# Patient Record
(demographics unavailable — no encounter records)

---

## 2024-11-19 NOTE — HISTORY OF PRESENT ILLNESS
[de-identified] : The patient is a 24 year old L hand dominate male presenting for L knee follow up Date of Surgery: 07/09/24 Pain: At Rest: 0/10 With Activity: 2-3/10 Mechanism of injury:  injured left knee in practice doing donkey kicks This is NOT a Work-Related Injury being treated under Worker's Compensation.  This is NOT an athletic injury occurring associated with an interscholastic or organized sports team. Quality of symptoms: dull pain, swelling posterior knee Improves with: rest, PT Worse with: single leg balancing  Treatment/Imaging/Studies Since Last Visit: PT 2x/weel at [facility] Reports Available For Review Today: None  Out of work/sport: Currently working since 8/15/24 School/Sport/Position/Occupation: wealth management  Change since last visit: Patient reports that he continues to make good improvement in strength. minimal complaints of pain. Still experiencing swelling in his posterior knee. has not progressed to running yet. Additional Information:  s/p Left knee EUA arthroscopic excision of loose bodies with chondral debridement; open matrix associated autologous chondral site implantation to the lateral femoral condyle and trochlear groove with lateral release with lateral retinacular lengthening.

## 2024-11-19 NOTE — HISTORY OF PRESENT ILLNESS
[de-identified] : The patient is a 24 year old L hand dominate male presenting for L knee follow up Date of Surgery: 07/09/24 Pain: At Rest: 0/10 With Activity: 2-3/10 Mechanism of injury:  injured left knee in practice doing donkey kicks This is NOT a Work-Related Injury being treated under Worker's Compensation.  This is NOT an athletic injury occurring associated with an interscholastic or organized sports team. Quality of symptoms: dull pain, swelling posterior knee Improves with: rest, PT Worse with: single leg balancing  Treatment/Imaging/Studies Since Last Visit: PT 2x/weel at [facility] Reports Available For Review Today: None  Out of work/sport: Currently working since 8/15/24 School/Sport/Position/Occupation: wealth management  Change since last visit: Patient reports that he continues to make good improvement in strength. minimal complaints of pain. Still experiencing swelling in his posterior knee. has not progressed to running yet. Additional Information:  s/p Left knee EUA arthroscopic excision of loose bodies with chondral debridement; open matrix associated autologous chondral site implantation to the lateral femoral condyle and trochlear groove with lateral release with lateral retinacular lengthening.

## 2024-11-19 NOTE — PHYSICAL EXAM
[de-identified] : The patient is a well appearing 24-year-old male of their stated age. Patient ambulates with a normal gait  Negative straight leg raise.   Surgical site: Left knee     Incision sites: Well healed   Range of motion: 0-140 degrees    Motor Testin+/5 quadriceps strengthb   Stability Testing: Stable ligamentous examination    Swelling/Effusion: Mild effusion   Tenderness to palpation: None   Provocative testing: Negative, -LM/PS/AD    Right Calf: soft and nontender Left Calf: soft and nontender   Neurovascular Examination: Grossly intact, 2+ distal pulses Contralateral Extremity: Examination grossly benign   Assessment & Plan: The patient is approximately 4.5 months s/p left knee EUA arthroscopic excision of loose bodies with chondral debridement; open matrix associated autologous chondral site implantation to the lateral femoral condyle and trochlear groove with lateral release with lateral retinacular lengthening. (DOS: 24) and s/p left knee EUA, arthroscopic assisted ACL reconstruction with patellar tendon autograft, autologous impaction bone grafting of the patella with tibial autograft bone, LMR, synovectomy and excision of plica with interval improvement (3/15/22). The patient's post-op plan, protocol and activity modifications have been thoroughly discussed and the patient expressed understanding. The patients condition continues to improve, denying any catching nor locking symptoms. Continue physical therapy to work on quad strengthening. We will order the patients post-op MRI to evaluate incorporation of ALVAREZ procedure, hold on obtaining until 6 months post-op. The patient will control pain as discussed & continue ice and elevation as needed. The patient otherwise may advance activity as discussed. Follow up 6 weeks, he will obtain an MRI one week prior to coming in.   The patient's current medication management of their orthopedic diagnosis was reviewed today: (1) We discussed a comprehensive treatment plan that included possible pharmaceutical management involving the use of prescription strength medications including but not limited to options such as oral Naprosyn 500mg BID, once daily Meloxicam 15 mg, or 500-650 mg Tylenol versus over the counter oral medications and topical prescription NSAID Pennsaid vs over the counter Voltaren gel.  Based on our extensive discussion, the patient declined prescription medication and will use over the counter Advil, Alleve, Voltaren Gel or Tylenol as directed. (2) There is a moderate risk of morbidity with further treatment, especially from use of prescription strength medications and possible side effects of these medications which include upset stomach with oral medications, skin reactions to topical medications and cardiac/renal issues with long term use. (3) I recommended that the patient follow-up with their medical physician to discuss any significant specific potential issues with long term medication use such as interactions with current medications or with exacerbation of underlying medical comorbidities. (4) The benefits and risks associated with use of injectable, oral or topical, prescription and over the counter anti-inflammatory medications were discussed with the patient. The patient voiced understanding of the risks including but not limited to bleeding, stroke, kidney dysfunction, heart disease, and were referred to the black box warning label for further information.   IDaisha attest that this documentation has been prepared under the direction and in the presence of Provider Dr. Chriss White.   The documentation recorded by the scribe accurately reflects the service PEARL Avalos PA-C personally performed and the decisions made by me. The patient was seen by me under the direct supervision of Dr. Chriss White

## 2024-11-19 NOTE — PHYSICAL EXAM
[de-identified] : The patient is a well appearing 24-year-old male of their stated age. Patient ambulates with a normal gait  Negative straight leg raise.   Surgical site: Left knee     Incision sites: Well healed   Range of motion: 0-140 degrees    Motor Testin+/5 quadriceps strengthb   Stability Testing: Stable ligamentous examination    Swelling/Effusion: Mild effusion   Tenderness to palpation: None   Provocative testing: Negative, -LM/PS/AD    Right Calf: soft and nontender Left Calf: soft and nontender   Neurovascular Examination: Grossly intact, 2+ distal pulses Contralateral Extremity: Examination grossly benign   Assessment & Plan: The patient is approximately 4.5 months s/p left knee EUA arthroscopic excision of loose bodies with chondral debridement; open matrix associated autologous chondral site implantation to the lateral femoral condyle and trochlear groove with lateral release with lateral retinacular lengthening. (DOS: 24) and s/p left knee EUA, arthroscopic assisted ACL reconstruction with patellar tendon autograft, autologous impaction bone grafting of the patella with tibial autograft bone, LMR, synovectomy and excision of plica with interval improvement (3/15/22). The patient's post-op plan, protocol and activity modifications have been thoroughly discussed and the patient expressed understanding. The patients condition continues to improve, denying any catching nor locking symptoms. Continue physical therapy to work on quad strengthening. We will order the patients post-op MRI to evaluate incorporation of ALVAREZ procedure, hold on obtaining until 6 months post-op. The patient will control pain as discussed & continue ice and elevation as needed. The patient otherwise may advance activity as discussed. Follow up 6 weeks, he will obtain an MRI one week prior to coming in.   The patient's current medication management of their orthopedic diagnosis was reviewed today: (1) We discussed a comprehensive treatment plan that included possible pharmaceutical management involving the use of prescription strength medications including but not limited to options such as oral Naprosyn 500mg BID, once daily Meloxicam 15 mg, or 500-650 mg Tylenol versus over the counter oral medications and topical prescription NSAID Pennsaid vs over the counter Voltaren gel.  Based on our extensive discussion, the patient declined prescription medication and will use over the counter Advil, Alleve, Voltaren Gel or Tylenol as directed. (2) There is a moderate risk of morbidity with further treatment, especially from use of prescription strength medications and possible side effects of these medications which include upset stomach with oral medications, skin reactions to topical medications and cardiac/renal issues with long term use. (3) I recommended that the patient follow-up with their medical physician to discuss any significant specific potential issues with long term medication use such as interactions with current medications or with exacerbation of underlying medical comorbidities. (4) The benefits and risks associated with use of injectable, oral or topical, prescription and over the counter anti-inflammatory medications were discussed with the patient. The patient voiced understanding of the risks including but not limited to bleeding, stroke, kidney dysfunction, heart disease, and were referred to the black box warning label for further information.   IDaisha attest that this documentation has been prepared under the direction and in the presence of Provider Dr. Chriss White.   The documentation recorded by the scribe accurately reflects the service PEARL Avalos PA-C personally performed and the decisions made by me. The patient was seen by me under the direct supervision of Dr. Chriss White

## 2024-12-23 NOTE — PHYSICAL EXAM
[de-identified] : The patient is a well appearing 24-year-old male of their stated age. Patient ambulates with a normal gait  Negative straight leg raise.   Surgical site: Left knee     Incision sites: Well healed   Range of motion: 0-140 degrees    Motor Testin+/5 quadriceps strengthb   Stability Testing: Stable ligamentous examination    Swelling/Effusion: Mild effusion   Tenderness to palpation: None   Provocative testing: Negative, -LM/PS/AD    Right Calf: soft and nontender Left Calf: soft and nontender   Neurovascular Examination: Grossly intact, 2+ distal pulses Contralateral Extremity: Examination grossly benign  Documents/Results Reviewed Today:  MRI left knee Tests/Studies Independently Interpreted Today: MRI left knee reveals evidence of interval maturation of ALVAREZ still has slight increase T2 images consistent with continued healing process   Assessment & Plan: The patient is approximately 5 months s/p left knee EUA arthroscopic excision of loose bodies with chondral debridement; open matrix associated autologous chondral site implantation to the lateral femoral condyle and trochlear groove with lateral release with lateral retinacular lengthening. (DOS: 24) and s/p left knee EUA, arthroscopic assisted ACL reconstruction with patellar tendon autograft, autologous impaction bone grafting of the patella with tibial autograft bone, LMR, synovectomy and excision of plica with interval improvement (3/15/22). The patient's post-op plan, protocol and activity modifications have been thoroughly discussed and the patient expressed understanding. The patient's condition continues to improve, denying any catching nor locking symptoms. Continue physical therapy to work on quad strengthening. The patient will control pain as discussed & continue ice and elevation as needed. The patient otherwise may advance activity as discussed. Follow up 6 months  The patient's current medication management of their orthopedic diagnosis was reviewed today: (1) We discussed a comprehensive treatment plan that included possible pharmaceutical management involving the use of prescription strength medications including but not limited to options such as oral Naprosyn 500mg BID, once daily Meloxicam 15 mg, or 500-650 mg Tylenol versus over the counter oral medications and topical prescription NSAID Pennsaid vs over the counter Voltaren gel.  Based on our extensive discussion, the patient declined prescription medication and will use over the counter Advil, Alleve, Voltaren Gel or Tylenol as directed. (2) There is a moderate risk of morbidity with further treatment, especially from use of prescription strength medications and possible side effects of these medications which include upset stomach with oral medications, skin reactions to topical medications and cardiac/renal issues with long term use. (3) I recommended that the patient follow-up with their medical physician to discuss any significant specific potential issues with long term medication use such as interactions with current medications or with exacerbation of underlying medical comorbidities. (4) The benefits and risks associated with use of injectable, oral or topical, prescription and over the counter anti-inflammatory medications were discussed with the patient. The patient voiced understanding of the risks including but not limited to bleeding, stroke, kidney dysfunction, heart disease, and were referred to the black box warning label for further information.   Miranda KANG attest that this documentation has been prepared under the direction and in the presence of Provider Dr. Chriss White.  The documentation recorded by the scribe accurately reflects the services Dr. Chriss KANG, personally performed and the decisions made by me.

## 2024-12-23 NOTE — HISTORY OF PRESENT ILLNESS
[de-identified] : The patient is a 24 year old L hand dominate male presenting for L knee follow up Date of Surgery: 07/09/24 Pain: At Rest: 0/10 With Activity: 2-3/10 Mechanism of injury:  injured left knee in practice doing donkey kicks This is NOT a Work-Related Injury being treated under Worker's Compensation.  This is NOT an athletic injury occurring associated with an interscholastic or organized sports team. Quality of symptoms: dull pain, swelling posterior knee Improves with: rest, PT Worse with: single leg balancing  Treatment/Imaging/Studies Since Last Visit: PT 1x/weel at [facility], MRI [facility] 12/23/24 Reports Available For Review Today: No report available   Out of work/sport: Currently working since 8/15/24 School/Sport/Position/Occupation: wealth management  Change since last visit: Feeling about the same. Here to review MRI results. No report is available.  Additional Information:  s/p Left knee EUA arthroscopic excision of loose bodies with chondral debridement; open matrix associated autologous chondral site implantation to the lateral femoral condyle and trochlear groove with lateral release with lateral retinacular lengthening.

## 2025-05-13 NOTE — HISTORY OF PRESENT ILLNESS
[de-identified] : The patient is a 25 year old L hand dominant male presenting for L knee follow up Date of Surgery: 07/09/24 Pain: At Rest: 0/10 With Activity: 1-2/10 Mechanism of injury:  injured left knee in practice doing donkey kicks This is NOT a Work-Related Injury being treated under Worker's Compensation.  This is NOT an athletic injury occurring associated with an interscholastic or organized sports team. Quality of symptoms: dull pain, swelling posterior knee Improves with: rest, PT Worse with: single leg balancing  Treatment/Imaging/Studies Since Last Visit: PT 1x/weel at [facility] and [facility] Reports Available For Review Today: none Out of work/sport: Currently working since 8/15/24 School/Sport/Position/Occupation: wealth management  Change since last visit: Feeling better. In PT 1x/week and doing HEP. Was feeling like his strength plateaued at one point but with increasing load he saw an increase in inflammation but then saw an increase in strength. Has been working on jumping/hopping, landing, strengthening in PT. Did not start running at PT yet due to strength discrepancies with strength testing (67%). States his main concern at the moment is feeling cracking in the patella after prolonged standing.  Additional Information:  s/p Left knee EUA arthroscopic excision of loose bodies with chondral debridement; open matrix associated autologous chondral site implantation to the lateral femoral condyle and trochlear groove with lateral release with lateral retinacular lengthening.

## 2025-05-13 NOTE — HISTORY OF PRESENT ILLNESS
[de-identified] : The patient is a 25 year old L hand dominant male presenting for L knee follow up Date of Surgery: 07/09/24 Pain: At Rest: 0/10 With Activity: 1-2/10 Mechanism of injury:  injured left knee in practice doing donkey kicks This is NOT a Work-Related Injury being treated under Worker's Compensation.  This is NOT an athletic injury occurring associated with an interscholastic or organized sports team. Quality of symptoms: dull pain, swelling posterior knee Improves with: rest, PT Worse with: single leg balancing  Treatment/Imaging/Studies Since Last Visit: PT 1x/weel at [facility] and [facility] Reports Available For Review Today: none Out of work/sport: Currently working since 8/15/24 School/Sport/Position/Occupation: wealth management  Change since last visit: Feeling better. In PT 1x/week and doing HEP. Was feeling like his strength plateaued at one point but with increasing load he saw an increase in inflammation but then saw an increase in strength. Has been working on jumping/hopping, landing, strengthening in PT. Did not start running at PT yet due to strength discrepancies with strength testing (67%). States his main concern at the moment is feeling cracking in the patella after prolonged standing.  Additional Information:  s/p Left knee EUA arthroscopic excision of loose bodies with chondral debridement; open matrix associated autologous chondral site implantation to the lateral femoral condyle and trochlear groove with lateral release with lateral retinacular lengthening.

## 2025-05-13 NOTE — IMAGING
[de-identified] : The patient is a well appearing 25-year-old male of their stated age. Patient ambulates with a normal gait  Negative straight leg raise.   Surgical site: Left knee     Incision sites: Well healed   Range of motion: 0-140 degrees with slight click     Motor Testin-/5 quadriceps strength    Stability Testing: Stable ligamentous examination    Swelling/Effusion: None    Tenderness to palpation: None   Provocative testing: Negative, -LM/PS/AD    Right Calf: soft and nontender Left Calf: soft and nontender   Neurovascular Examination: Grossly intact, 2+ distal pulses Contralateral Extremity: Examination grossly benign   Assessment & Plan: The patient is approximately 10 months s/p left knee EUA arthroscopic excision of loose bodies with chondral debridement; open matrix associated autologous chondral site implantation to the lateral femoral condyle and trochlear groove with lateral release with lateral retinacular lengthening. (DOS: 24) and approximately 14 months s/p left knee EUA, arthroscopic assisted ACL reconstruction with patellar tendon autograft, autologous impaction bone grafting of the patella with tibial autograft bone, LMR, synovectomy and excision of plica with interval improvement (3/15/22). The patient's post-op plan, protocol and activity modifications have been thoroughly discussed and the patient expressed understanding. The patient reports a recent set back since increasing his sport specific activity although, symptoms since have resolved. Overall, he has been advancing well working on both strength and endurance. He denies any catching nor locking symptoms. Continue to increase activity as tolerated. Recommended he utilize full length shoe inserts. The patient will control pain as discussed & continue ice and elevation as needed. Upon being one year post-op, the patient will follow up to obtain and MRI to evaluate healing & integration of ALVAREZ procedure - he will email [facility] for an Rx. The patient otherwise may advance activity as discussed.   The patient's current medication management of their orthopedic diagnosis was reviewed today: The patient declined and/or was contraindicated for the recommended prescription medication Naprosyn and will use over the counter Advil, Aleve, Voltaren Gel or Tylenol as directed. (1) We discussed a comprehensive treatment plan that included possible pharmaceutical management involving the use of prescription strength medications including but not limited to options such as oral Naprosyn 500mg BID, once daily Meloxicam 15 mg, or 500-650 mg Tylenol versus over the counter oral medications and topical prescription NSAID Pennsaid vs over the counter Voltaren gel.  Based on our extensive discussion, the patient declined prescription medication and will use over the counter Advil, Aleve, Voltaren Gel or Tylenol as directed. (2) There is a moderate risk of morbidity with further treatment, especially from use of prescription strength medications and possible side effects of these medications which include upset stomach with oral medications, skin reactions to topical medications and cardiac/renal issues with long term use. (3) I recommended that the patient follow-up with their medical physician to discuss any significant specific potential issues with long term medication use such as interactions with current medications or with exacerbation of underlying medical comorbidities. (4) The benefits and risks associated with use of injectable, oral or topical, prescription and over the counter anti-inflammatory medications were discussed with the patient. The patient voiced understanding of the risks including but not limited to bleeding, stroke, kidney dysfunction, heart disease, and were referred to the black box warning label for further information.   IDaisha attest that this documentation has been prepared under the direction and in the presence of Provider Dr. Chriss White.   The documentation recorded by the scribe accurately reflects the services IDr. Chriss, personally performed and the decisions made by me.

## 2025-05-13 NOTE — IMAGING
[de-identified] : The patient is a well appearing 25-year-old male of their stated age. Patient ambulates with a normal gait  Negative straight leg raise.   Surgical site: Left knee     Incision sites: Well healed   Range of motion: 0-140 degrees with slight click     Motor Testin-/5 quadriceps strength    Stability Testing: Stable ligamentous examination    Swelling/Effusion: None    Tenderness to palpation: None   Provocative testing: Negative, -LM/PS/AD    Right Calf: soft and nontender Left Calf: soft and nontender   Neurovascular Examination: Grossly intact, 2+ distal pulses Contralateral Extremity: Examination grossly benign   Assessment & Plan: The patient is approximately 10 months s/p left knee EUA arthroscopic excision of loose bodies with chondral debridement; open matrix associated autologous chondral site implantation to the lateral femoral condyle and trochlear groove with lateral release with lateral retinacular lengthening. (DOS: 24) and approximately 14 months s/p left knee EUA, arthroscopic assisted ACL reconstruction with patellar tendon autograft, autologous impaction bone grafting of the patella with tibial autograft bone, LMR, synovectomy and excision of plica with interval improvement (3/15/22). The patient's post-op plan, protocol and activity modifications have been thoroughly discussed and the patient expressed understanding. The patient reports a recent set back since increasing his sport specific activity although, symptoms since have resolved. Overall, he has been advancing well working on both strength and endurance. He denies any catching nor locking symptoms. Continue to increase activity as tolerated. Recommended he utilize full length shoe inserts. The patient will control pain as discussed & continue ice and elevation as needed. Upon being one year post-op, the patient will follow up to obtain and MRI to evaluate healing & integration of ALVAREZ procedure - he will email [facility] for an Rx. The patient otherwise may advance activity as discussed.   The patient's current medication management of their orthopedic diagnosis was reviewed today: The patient declined and/or was contraindicated for the recommended prescription medication Naprosyn and will use over the counter Advil, Aleve, Voltaren Gel or Tylenol as directed. (1) We discussed a comprehensive treatment plan that included possible pharmaceutical management involving the use of prescription strength medications including but not limited to options such as oral Naprosyn 500mg BID, once daily Meloxicam 15 mg, or 500-650 mg Tylenol versus over the counter oral medications and topical prescription NSAID Pennsaid vs over the counter Voltaren gel.  Based on our extensive discussion, the patient declined prescription medication and will use over the counter Advil, Aleve, Voltaren Gel or Tylenol as directed. (2) There is a moderate risk of morbidity with further treatment, especially from use of prescription strength medications and possible side effects of these medications which include upset stomach with oral medications, skin reactions to topical medications and cardiac/renal issues with long term use. (3) I recommended that the patient follow-up with their medical physician to discuss any significant specific potential issues with long term medication use such as interactions with current medications or with exacerbation of underlying medical comorbidities. (4) The benefits and risks associated with use of injectable, oral or topical, prescription and over the counter anti-inflammatory medications were discussed with the patient. The patient voiced understanding of the risks including but not limited to bleeding, stroke, kidney dysfunction, heart disease, and were referred to the black box warning label for further information.   IDaisha attest that this documentation has been prepared under the direction and in the presence of Provider Dr. Chriss White.   The documentation recorded by the scribe accurately reflects the services IDr. Chriss, personally performed and the decisions made by me.